# Patient Record
(demographics unavailable — no encounter records)

---

## 2024-10-14 NOTE — PHYSICAL EXAM
[General Appearance - Well Developed] : well developed [Normal Appearance] : normal appearance [Well Groomed] : well groomed [General Appearance - Well Nourished] : well nourished [No Deformities] : no deformities [General Appearance - In No Acute Distress] : no acute distress [Normal Conjunctiva] : the conjunctiva exhibited no abnormalities [Eyelids - No Xanthelasma] : the eyelids demonstrated no xanthelasmas [Normal Oral Mucosa] : normal oral mucosa [No Oral Pallor] : no oral pallor [No Oral Cyanosis] : no oral cyanosis [Exaggerated Use Of Accessory Muscles For Inspiration] : no accessory muscle use [Respiration, Rhythm And Depth] : normal respiratory rhythm and effort [Auscultation Breath Sounds / Voice Sounds] : lungs were clear to auscultation bilaterally [Abdomen Soft] : soft [Abdomen Tenderness] : non-tender [Abdomen Mass (___ Cm)] : no abdominal mass palpated [Abnormal Walk] : normal gait [Gait - Sufficient For Exercise Testing] : the gait was sufficient for exercise testing [Nail Clubbing] : no clubbing of the fingernails [Petechial Hemorrhages (___cm)] : no petechial hemorrhages [Cyanosis, Localized] : no localized cyanosis [Skin Color & Pigmentation] : normal skin color and pigmentation [] : no rash [No Venous Stasis] : no venous stasis [Skin Lesions] : no skin lesions [No Skin Ulcers] : no skin ulcer [No Xanthoma] : no  xanthoma was observed [Oriented To Time, Place, And Person] : oriented to person, place, and time [Affect] : the affect was normal [Mood] : the mood was normal [No Anxiety] : not feeling anxious [Normal Rate] : normal [Rhythm Regular] : regular [No Murmur] : no murmurs heard [1+] : left 1+ [No Pitting Edema] : no pitting edema present [FreeTextEntry1] : No bruit

## 2024-10-14 NOTE — REASON FOR VISIT
[Arrhythmia/ECG Abnorrmalities] : arrhythmia/ECG abnormalities [Consultation] : a consultation regarding [Syncope] : syncope [FreeTextEntry3] : Dr. Brady

## 2024-10-14 NOTE — HISTORY OF PRESENT ILLNESS
[FreeTextEntry1] : 56-year-old female PMHx: HTN, HLD, anemia, lung nodules, CAD, NINOSKA, chronic venous insufficiency, vertigo, achalasia s/p POEM procedure, GERD, high grade AV block s/p PPM  - She had a very low CAC score and no obstructive CAD in 2021 on CTA. Echo showed normal LV systolic function, and the patient had a venous doppler which showed no DVT.  -Pt has PPM follows with EP MD Mills -Follows with pulmonology for a lung nodule, may have sleep apnea, MD Castle  Patient presents for follow up visit. Patient was recently seen in ED for syncopal episode on 10/8. Patient's mother had passed away on that day, was in hospital saying her goodbyes when leaving she has a witnessed syncopal episode. CT Angio Chest PE protocol done in ER in light of elevated D-Dimer, no evidence of PE, small pericardial effusion noted. Patient reports unchanged episodes of chest heaviness for years worsening with exertion. Pt has known SOB worsening with exertion, follows with Dr Corrine Castle. With exertion patient feels lightheaded and dizzy.

## 2024-10-14 NOTE — CARDIOLOGY SUMMARY
[___] : [unfilled] [de-identified] : 7- NSR Normal ECG .  2- NSR Normal ECG  10- NSR Normal ECG .  10- NSR ST 101bpm  [de-identified] : 8- EF 64% Trace MR and TR RVSP was 34 mmhg  [de-identified] : 3- CAC score of 8 mild palque in diagonal branch No signficant CAD .  [de-identified] : 8- No DVT bilaterally

## 2024-10-14 NOTE — REVIEW OF SYSTEMS
[Feeling Fatigued] : feeling fatigued [SOB] : shortness of breath [Chest Discomfort] : chest discomfort [Cough] : cough [Joint Pain] : joint pain [Negative] : Psychiatric [FreeTextEntry7] : GERD

## 2024-10-14 NOTE — ASSESSMENT
[FreeTextEntry1] : 56-year-old female PMHx: HTN, HLD, anemia, lung nodules, CAD, NINOSKA, chronic venous insufficiency, vertigo, achalasia s/p POEM procedure, GERD, high grade AV block s/p PPM  - She had a very low CAC score and no obstructive CAD in 2021 on CTA. Echo showed normal LV systolic function, and the patient had a venous doppler which showed no DVT.  -Pt has PPM follows with EP MD Mills -Follows with pulmonology for a lung nodule, may have sleep apnea, Dr. Castle The patient has had a pre syncopal episode which is suspected to be vasovagal in etiology. Will have EP interrogate her PPM and will repeat echo .     Plan: EP to interrogate her PPM for arrhythmias during the episode  echocardiogram  Follow up with Dr. Castle . D Dimer was high but no PE on CTA .

## 2024-10-14 NOTE — CARDIOLOGY SUMMARY
[___] : [unfilled] [de-identified] : 7- NSR Normal ECG .  2- NSR Normal ECG  10- NSR Normal ECG .  10- NSR ST 101bpm  [de-identified] : 8- EF 64% Trace MR and TR RVSP was 34 mmhg  [de-identified] : 3- CAC score of 8 mild palque in diagonal branch No signficant CAD .  [de-identified] : 8- No DVT bilaterally  Hatchet Flap Text: The defect edges were debeveled with a #15 scalpel blade.  Given the location of the defect, shape of the defect and the proximity to free margins a hatchet flap was deemed most appropriate.  Using a sterile surgical marker, an appropriate hatchet flap was drawn incorporating the defect and placing the expected incisions within the relaxed skin tension lines where possible.    The area thus outlined was incised deep to adipose tissue with a #15 scalpel blade.  The skin margins were undermined to an appropriate distance in all directions utilizing iris scissors.

## 2024-10-14 NOTE — PHYSICAL EXAM
[General Appearance - Well Developed] : well developed [Normal Appearance] : normal appearance [Well Groomed] : well groomed [General Appearance - Well Nourished] : well nourished [No Deformities] : no deformities [General Appearance - In No Acute Distress] : no acute distress [Normal Conjunctiva] : the conjunctiva exhibited no abnormalities [Eyelids - No Xanthelasma] : the eyelids demonstrated no xanthelasmas [Normal Oral Mucosa] : normal oral mucosa [No Oral Pallor] : no oral pallor [No Oral Cyanosis] : no oral cyanosis [Respiration, Rhythm And Depth] : normal respiratory rhythm and effort [Exaggerated Use Of Accessory Muscles For Inspiration] : no accessory muscle use [Auscultation Breath Sounds / Voice Sounds] : lungs were clear to auscultation bilaterally [Abdomen Soft] : soft [Abdomen Tenderness] : non-tender [Abdomen Mass (___ Cm)] : no abdominal mass palpated [Abnormal Walk] : normal gait [Gait - Sufficient For Exercise Testing] : the gait was sufficient for exercise testing [Nail Clubbing] : no clubbing of the fingernails [Cyanosis, Localized] : no localized cyanosis [Petechial Hemorrhages (___cm)] : no petechial hemorrhages [Skin Color & Pigmentation] : normal skin color and pigmentation [] : no rash [No Venous Stasis] : no venous stasis [Skin Lesions] : no skin lesions [No Skin Ulcers] : no skin ulcer [No Xanthoma] : no  xanthoma was observed [Oriented To Time, Place, And Person] : oriented to person, place, and time [Affect] : the affect was normal [Mood] : the mood was normal [No Anxiety] : not feeling anxious [Normal Rate] : normal [Rhythm Regular] : regular [No Murmur] : no murmurs heard [1+] : left 1+ [No Pitting Edema] : no pitting edema present [FreeTextEntry1] : No bruit

## 2024-10-31 NOTE — PHYSICAL EXAM
[General Appearance - Well Developed] : well developed [Normal Appearance] : normal appearance [Well Groomed] : well groomed [General Appearance - Well Nourished] : well nourished [No Deformities] : no deformities [General Appearance - In No Acute Distress] : no acute distress [Heart Rate And Rhythm] : heart rate and rhythm were normal [Heart Sounds] : normal S1 and S2 [Murmurs] : no murmurs present [Left Infraclavicular] : left infraclavicular area [Clean] : clean [Dry] : dry [Well-Healed] : well-healed [Nail Clubbing] : no clubbing of the fingernails [Cyanosis, Localized] : no localized cyanosis [Arterial Pulses Normal] : the arterial pulses were normal [] : no respiratory distress [Respiration, Rhythm And Depth] : normal respiratory rhythm and effort [Exaggerated Use Of Accessory Muscles For Inspiration] : no accessory muscle use [Auscultation Breath Sounds / Voice Sounds] : lungs were clear to auscultation bilaterally [Abdomen Soft] : soft

## 2024-10-31 NOTE — PROCEDURE
[NSR] : normal sinus rhythm [Pacemaker] : pacemaker [DDDR] : DDDR [Threshold Testing Performed] : Threshold testing was performed [Apace-Vsense ___ %] : Apace-Vsense [unfilled]% [Lead Imp:  ___ohms] : lead impedance was [unfilled] ohms [Sensing Amplitude ___mv] : sensing amplitude was [unfilled] mv [___V @] : [unfilled] V [___ ms] : [unfilled] ms [Counters Reset] : the counters were not reset [de-identified] :  [de-identified] : 6 years 4 months [de-identified] : Several HVR episodes are sinus tach, most concentrated on day patient was recently in hospital AP: 26% : 0% Patient paired with new remote monitor in office today

## 2024-10-31 NOTE — ASSESSMENT
[FreeTextEntry1] : ## Severe vagal syncope s/p PPM placement (Bio, 21, Non-dep) ## Sinus Node dysfunction  - PPM interrogation shows normally functioning DC-PPM. No significant events.  - The patient is on remote and transmitting.   # GONZALEZ, Dizziness, off-balance - No causative factor due to PPM. - ECHO WNL. - I recommend rheum w/u to r/o autoimmune causes. Patient has seen ENT and neuro surg for symptoms. Possibly candidate for repeat spinal fusion and cervical fusion but wants to hold off. Patient has poor QOL due to symptoms. She spends most of her day at home in bed. Enocuraged to follow up with PCP as well. - Rheum rec provided.   RTO in 9-12 months
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
## 2025-04-29 NOTE — ASSESSMENT
[FreeTextEntry1] : 56yo F with both chronic cervical and lumbar radiculopathy. Conservative management has failed to provide relief. Her PPM is MRI conditional and so she will complete MRIs of the C and L spine and return to the office after for review, sooner if needed.    ATTESTATION: I personally reviewed with the PA/NP, this patient's history and physical exam findings, as documented above. I have discussed the relevant areas of concern, having direct implications to the presenting problems and illnesses, and I have personally examined all pertinent and positive and negative findings, which impact their neurosurgical treatment.    I, Dr. Acosta, attest that this documentation has been prepared by rTice Unger MS, FNP-BC under my presence and direction  Trice Unger MS, FNP-ABDOUL Acosta MD, FAANS
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
## 2025-04-29 NOTE — ASSESSMENT
[FreeTextEntry1] : 56yo F with both chronic cervical and lumbar radiculopathy. Conservative management has failed to provide relief. Her PPM is MRI conditional and so she will complete MRIs of the C and L spine and return to the office after for review, sooner if needed.    ATTESTATION: I personally reviewed with the PA/NP, this patient's history and physical exam findings, as documented above. I have discussed the relevant areas of concern, having direct implications to the presenting problems and illnesses, and I have personally examined all pertinent and positive and negative findings, which impact their neurosurgical treatment.    I, Dr. Acosta, attest that this documentation has been prepared by Trice Unger MS, FNP-BC under my presence and direction  Trice Unger MS, FNP-ABDOUL Acosta MD, FAANS

## 2025-05-02 NOTE — HISTORY OF PRESENT ILLNESS
[FreeTextEntry1] : Patient complaining of neck pain that radiates down her arms and low back pain that radiates down her legs that can be severe in nature. She has an mri booked in 2 weeks.
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
## 2025-05-22 NOTE — ASSESSMENT
[FreeTextEntry1] : This is a 58 yo F who presents for neurosurgical evaluation with regards to chronic pain involving the cervical and lumbar segments. Updated imaging reviewed, there is no correlative pathology detected at this time. We do not advise for neurosurgical intervention and she is to proceed with further Pain Management treatments to manage her chronic pain syndrome.  She is to return to the office as needed moving forward and can contact us with any concerns in the interim.   ATTESTATION: I personally reviewed with the PA/NP, this patient's history and physical exam findings, as documented above. I have discussed the relevant areas of concern, having direct implications to the presenting problems and illnesses, and I have personally examined all pertinent and positive and negative findings, which impact their neurosurgical treatment.    I, Dr. Acosta, attest that this documentation has been prepared by Talya Schroeder PA-C under my presence and direction  GRADY Maynard MD

## 2025-05-30 NOTE — HISTORY OF PRESENT ILLNESS
[FreeTextEntry1] : Patient complaining of neck pain that radiates down her arms and low back pain that radiates down her legs that can be severe in nature. MRIs reviewed today.

## 2025-07-11 NOTE — PHYSICAL EXAM
[General Appearance - Well Developed] : well developed [Normal Appearance] : normal appearance [Well Groomed] : well groomed [General Appearance - Well Nourished] : well nourished [No Deformities] : no deformities [General Appearance - In No Acute Distress] : no acute distress [Normal Conjunctiva] : the conjunctiva exhibited no abnormalities [Eyelids - No Xanthelasma] : the eyelids demonstrated no xanthelasmas [Normal Oral Mucosa] : normal oral mucosa [No Oral Pallor] : no oral pallor [No Oral Cyanosis] : no oral cyanosis [FreeTextEntry1] : No bruit  [Respiration, Rhythm And Depth] : normal respiratory rhythm and effort [Exaggerated Use Of Accessory Muscles For Inspiration] : no accessory muscle use [Auscultation Breath Sounds / Voice Sounds] : lungs were clear to auscultation bilaterally [Abdomen Soft] : soft [Abdomen Tenderness] : non-tender [Abdomen Mass (___ Cm)] : no abdominal mass palpated [Abnormal Walk] : normal gait [Gait - Sufficient For Exercise Testing] : the gait was sufficient for exercise testing [Nail Clubbing] : no clubbing of the fingernails [Cyanosis, Localized] : no localized cyanosis [Petechial Hemorrhages (___cm)] : no petechial hemorrhages [Skin Color & Pigmentation] : normal skin color and pigmentation [] : no rash [No Venous Stasis] : no venous stasis [Skin Lesions] : no skin lesions [No Skin Ulcers] : no skin ulcer [No Xanthoma] : no  xanthoma was observed [Oriented To Time, Place, And Person] : oriented to person, place, and time [Affect] : the affect was normal [Mood] : the mood was normal [No Anxiety] : not feeling anxious [Normal Rate] : normal [Rhythm Regular] : regular [No Murmur] : no murmurs heard [1+] : left 1+ [No Pitting Edema] : no pitting edema present

## 2025-07-11 NOTE — HISTORY OF PRESENT ILLNESS
[FreeTextEntry1] : 56-year-old female PMHx: HTN, HLD, anemia, lung nodules, CAD, NINOSKA, chronic venous insufficiency, vertigo, achalasia s/p POEM procedure, GERD, high grade AV block s/p PPM  - She had a very low CAC score and no obstructive CAD in 2021 on CTA. Echo showed normal LV systolic function, and the patient had a venous doppler which showed no DVT.  -Pt has PPM follows with EP MD Mills -Follows with pulmonology for a lung nodule, may have sleep apnea, MD Castle  Patient presents for follow up visit. Patient was recently seen in ED for syncopal episode on 10/8. Patient's mother had passed away on that day, was in hospital saying her goodbyes when leaving she has a witnessed syncopal episode. CT Angio Chest PE protocol done in ER in light of elevated D-Dimer, no evidence of PE, small pericardial effusion noted. Patient reports unchanged episodes of chest heaviness for years worsening with exertion. Pt has known SOB worsening with exertion, follows with Dr Castle. With exertion patient feels lightheaded and dizzy .  7- The patient has had no further syncopal episodes . The patient still has GERD like symptoms and is going for EGD and colonoscopy. The patient has had had mild CAD in the past and she has chronic GONZALEZ. She has mild NINOSKA . She does follow up with EP for PPM check which was placed for [facility] She is not utilizing her PPM on resting ECG today

## 2025-07-11 NOTE — ASSESSMENT
[FreeTextEntry1] : 57-year-old female PMHx: HTN, HLD, anemia, lung nodules, CAD, NINOSKA, chronic venous insufficiency, vertigo, achalasia s/p POEM procedure, GERD, high grade AV block s/p PPM  - She had a very low CAC score and no obstructive CAD in 2021 on CTA. Echo showed normal LV systolic function, and the patient had a venous doppler which showed no DVT.  -Pt has PPM follows with EP -Follows with pulmonology for a lung nodule, and she has mild NINOSKA follows with, Dr. Castle The patient has had a pre syncopal episode which is suspected to be vasovagal in etiology.  Interrogation of PPM at the time showed no arrhythmias  The patient is going for EGD and colonoscopy .The patient is an intermediate cardiac risk undergoing a minor risk procedure    Plan: Stable from cardiac standpoint for EGD and colonoscopy with intermediate risk  Management of her pain medications and psychiatric medications as per PCP , pain management and psychiatry  Follow up with EP  Follow up in 4-5 months

## 2025-07-11 NOTE — CARDIOLOGY SUMMARY
[de-identified] : 7- NSR Normal ECG .  2- NSR Normal ECG  10- NSR Normal ECG .  10- NSR ST 101bpm  7-121-2025 NSR Normal ECG  [de-identified] : 8- EF 64% Trace MR and TR RVSP was 34 mmhg   EF 73% Trace MR RVSP was 19 mmhg and IVC was normal  10- EF 73% Trace MR RVSP was 19 mmhg and IVC was normal .  [de-identified] : 3- CAC score of 8 mild palque in diagonal branch No signficant CAD .  [de-identified] : 8- No DVT bilaterally  [___] : [unfilled]

## 2025-07-11 NOTE — REASON FOR VISIT
[Arrhythmia/ECG Abnorrmalities] : arrhythmia/ECG abnormalities [FreeTextEntry3] : Dr. Brady  [Consultation] : a consultation regarding [Syncope] : syncope

## 2025-07-25 NOTE — PHYSICAL EXAM
[de-identified] : neck  pain with flex and extension  low back pain with flex and extension facet loading positive of low back  Alert and oriented to person, place and time

## 2025-07-25 NOTE — DISCUSSION/SUMMARY
[de-identified] :  A discussion regarding available pain management treatment options occurred with the patient. These included interventional, rehabilitative, pharmacological, and alternative modalities. We will proceed with the following:   Interventional treatment options: deferred at this time   Imaging: deferred at this time   Rehabilitative options: 1. c/w PT   Medications: 1. Ordered Gabapentin 600 mg TID (increased from 300mg BID) - Sent 30 days supply - Potential side effects were discussed which included dizziness, sedation, and pedal edema to name a few. If the patient cannot tolerate these side effects should they occur, then they will stop the medication. If the patient experiences sedation, they understand that they should not drive. The usage of the medication was discussed and the patient understands that it is an anti-epileptic medication used for neuropathic pain and that the pain relief from this medication will likely be subtle, and that hopefully in combination with the other treatments we are offering we will be able to get some additive relief.  2. Ordered meloxicam 15 mg daily PRN - I refilled a 30-day supply today. - Pt was advised to take the medication daily for 15 days, then take a one-week break, following that they could take it PRN. - I advised the patient that the NSAID should be taken with food. In addition, while taking the prescribed NSAID, no over the counter or other NSAIDs should be used, such as ibuprofen (Motrin or Advil) or naproxen (Aleve) as this can cause stomach upset or other side effects. If needed for fever or breakthrough pain Tylenol can be used.   Complementary treatment options: - lifestyle modifications discussed - avoid bending and bend with knees - avoid heavy lifting  - Follow up in 6-8 weeks   I, Faheem Alicea, attest that this documentation has been prepared under the direction and in the presence of Provider Keith Cohen, DO  The documentation recorded by the scribe, in my presence, accurately reflects the service I personally performed, and the decisions made by me with my edits as appropriate.  Best Regards, Keith Cohen D.O.

## 2025-07-25 NOTE — HISTORY OF PRESENT ILLNESS
[FreeTextEntry1] : Patient complaining of neck pain that radiates down her arms and low back pain that radiates down her legs that can be severe in nature. MRIs reviewed today.   07/25/2025 REVISIT ENCOUNTER Patient is presenting today with continued neck pain and low back pain. Since her last office visit, she has been using Meloxicam 15mg daily and Gabapentin 300mg BID with no relief. Pain is 8/10. On occasion, her neck pain radiates down her arms. Patient is not diabetic. She has had a spinal fusion. She has also been going to PT 2x a week with minimal relief.